# Patient Record
Sex: FEMALE | Race: WHITE | NOT HISPANIC OR LATINO | URBAN - METROPOLITAN AREA
[De-identification: names, ages, dates, MRNs, and addresses within clinical notes are randomized per-mention and may not be internally consistent; named-entity substitution may affect disease eponyms.]

---

## 2021-07-19 ENCOUNTER — IMPORTED ENCOUNTER (OUTPATIENT)
Dept: URBAN - METROPOLITAN AREA CLINIC 1 | Facility: CLINIC | Age: 71
End: 2021-07-19

## 2021-07-19 PROBLEM — H43.813: Noted: 2021-07-19

## 2021-07-19 PROBLEM — H40.1132: Noted: 2021-07-19

## 2021-07-19 PROBLEM — H04.123: Noted: 2021-07-19

## 2021-07-19 PROBLEM — H25.813: Noted: 2021-07-19

## 2021-07-19 PROCEDURE — 92015 DETERMINE REFRACTIVE STATE: CPT

## 2021-07-19 PROCEDURE — 92133 CPTRZD OPH DX IMG PST SGM ON: CPT

## 2021-07-19 PROCEDURE — 99204 OFFICE O/P NEW MOD 45 MIN: CPT

## 2021-07-19 NOTE — PATIENT DISCUSSION
1.  Severe OS/Moderate Open Angle Glaucoma OD -- (CD 0.50.8) OCT today showing Moderate thinning OS>OD. H/o SLT OU. Use Combigan BID OU and Lumigan QHS OU. Patient to continue with current gtt regimen. Patient advised to be compliant with gtts. Condition was discussed with patient and patient understands. Will continue to monitor patient for any progression in condition. Patient was advised to call us with any problems questions or concerns. 2.  Cataract OU -- Observe for now without intervention. The patient was advised to contact us if any change or worsening of vision3. PVD OU -- RD precautions. 4.  Dry Eyes OU -- The use/continuation of artificial tears were recommended. 5. [facility] CTL wearer Return for an appointment in 6 months for a 10/dfe/HVF/glare with Dr. Camille Jordan.

## 2022-02-04 ENCOUNTER — IMPORTED ENCOUNTER (OUTPATIENT)
Dept: URBAN - METROPOLITAN AREA CLINIC 1 | Facility: CLINIC | Age: 72
End: 2022-02-04

## 2022-02-04 PROBLEM — H40.1112: Noted: 2022-02-04

## 2022-02-04 PROBLEM — H40.1123: Noted: 2022-02-04

## 2022-02-04 PROBLEM — H25.813: Noted: 2022-02-04

## 2022-02-04 PROCEDURE — 99213 OFFICE O/P EST LOW 20 MIN: CPT

## 2022-02-04 PROCEDURE — 92083 EXTENDED VISUAL FIELD XM: CPT

## 2022-02-04 PROCEDURE — 92015 DETERMINE REFRACTIVE STATE: CPT

## 2022-02-04 NOTE — PATIENT DISCUSSION
1.  Severe Open Angle Glaucoma OS -Patient to continue with current gtt regimen. Patient advised to be compliant with gtts. Condition was discussed with patient and patient understands. Will continue to monitor patient for any progression in condition. Patient was advised to call us with any problems questions or concerns. 2.  Moderate Open Angle Glaucoma OD -Patient to continue with current gtt regimen. Patient advised to be compliant with gtts. Condition was discussed with patient and patient understands. Will continue to monitor patient for any progression in condition. Patient was advised to call us with any problems questions or concerns.

## 2022-02-18 ENCOUNTER — PRE-OP/H&P (OUTPATIENT)
Dept: URBAN - METROPOLITAN AREA CLINIC 1 | Facility: CLINIC | Age: 72
End: 2022-02-18

## 2022-02-18 DIAGNOSIS — H25.812: ICD-10-CM

## 2022-02-18 PROCEDURE — 92136 OPHTHALMIC BIOMETRY: CPT

## 2022-02-18 NOTE — PATIENT DISCUSSION
(Ascan/HP) Cataract (OS) -- Visually Significant (Secondary to glare), discussed the risks, benefits, alternatives, and limitations of cataract surgery. The patient stated a full understanding and a desire to proceed with the procedure. Discussed with patient if post-op drops are more than $120 for all three combined when filling at their Pharmacy, please call our office to request generic substitutions. Phaco PCL OS.

## 2022-03-02 ENCOUNTER — SURGERY/PROCEDURE (OUTPATIENT)
Dept: URBAN - METROPOLITAN AREA SURGERY 1 | Facility: SURGERY | Age: 72
End: 2022-03-02

## 2022-03-02 DIAGNOSIS — H25.813: ICD-10-CM

## 2022-03-02 PROCEDURE — 66984 XCAPSL CTRC RMVL W/O ECP: CPT

## 2022-03-02 NOTE — PATIENT DISCUSSION
Continue Combigan BID OU and Lumigan QHS OU. Patient advised to be compliant with gtts. Condition was discussed with patient and patient understands. Will continue to monitor patient for any progression in condition. Patient was advised to call us with any problems, questions, or concerns.

## 2022-03-03 ENCOUNTER — POST-OP (OUTPATIENT)
Dept: URBAN - METROPOLITAN AREA CLINIC 1 | Facility: CLINIC | Age: 72
End: 2022-03-03

## 2022-03-03 DIAGNOSIS — H25.811: ICD-10-CM

## 2022-03-03 PROCEDURE — 92136 OPHTHALMIC BIOMETRY: CPT

## 2022-03-03 RX ORDER — BRIMONIDINE TARTRATE, TIMOLOL MALEATE 2; 5 MG/ML; MG/ML: 1 SOLUTION/ DROPS OPHTHALMIC TWICE A DAY

## 2022-03-03 ASSESSMENT — TONOMETRY: OS_IOP_MMHG: 20

## 2022-03-03 ASSESSMENT — VISUAL ACUITY: OS_SC: 20/30

## 2022-03-03 NOTE — PATIENT DISCUSSION
Use Zylet BID OS and Prolensa Qdaily OS: Use gtts through completion of PO gtt chart regimen/ Per our instructions given to patient.

## 2022-03-03 NOTE — PATIENT DISCUSSION
(Ascan/HP) Cataract OD -- Visually Significant (Secondary to glare), discussed the risks, benefits, alternatives, and limitations of cataract surgery. The patient stated a full understanding and a desire to proceed with the procedure. Discussed with patient if post-op drops are more than $120 for all three combined when filling at their Pharmacy, please call our office to request generic substitutions.  Phaco PCL OD, planning myopic Goal.

## 2022-03-16 ENCOUNTER — SURGERY/PROCEDURE (OUTPATIENT)
Dept: URBAN - METROPOLITAN AREA SURGERY 1 | Facility: SURGERY | Age: 72
End: 2022-03-16

## 2022-03-16 DIAGNOSIS — H25.813: ICD-10-CM

## 2022-03-16 PROCEDURE — 66984 XCAPSL CTRC RMVL W/O ECP: CPT

## 2022-03-17 ENCOUNTER — POST-OP (OUTPATIENT)
Dept: URBAN - METROPOLITAN AREA CLINIC 1 | Facility: CLINIC | Age: 72
End: 2022-03-17

## 2022-03-17 DIAGNOSIS — Z96.1: ICD-10-CM

## 2022-03-17 PROCEDURE — 99024 POSTOP FOLLOW-UP VISIT: CPT

## 2022-03-17 ASSESSMENT — VISUAL ACUITY
OD_SC: 20/25
OS_SC: J2
OS_SC: 20/50

## 2022-03-17 ASSESSMENT — TONOMETRY
OS_IOP_MMHG: 25
OD_IOP_MMHG: 56

## 2022-03-17 NOTE — PATIENT DISCUSSION
IOP spike, 56 OD. 1 gtt Besivance instilled and aqueous release performed with 27 guage needle at slit lamp. Verbal consent given. IOP 4 post aqueous release.

## 2022-03-17 NOTE — PATIENT DISCUSSION
Use Zylet BID OD and Prolensa Qdaily OD: Use gtts through completion of PO gtt chart regimen/ Per our instructions given to patient.

## 2022-04-02 ASSESSMENT — VISUAL ACUITY
OD_GLARE: 20/40
OS_SC: 20/70
OD_SC: 20/30
OS_SC: 20/70-1
OD_SC: 20/40
OS_GLARE: 20/50

## 2022-04-02 ASSESSMENT — TONOMETRY
OS_IOP_MMHG: 14
OS_IOP_MMHG: 16
OD_IOP_MMHG: 16
OD_IOP_MMHG: 16

## 2022-04-29 ENCOUNTER — POST-OP (OUTPATIENT)
Dept: URBAN - METROPOLITAN AREA CLINIC 1 | Facility: CLINIC | Age: 72
End: 2022-04-29

## 2022-04-29 DIAGNOSIS — Z96.1: ICD-10-CM

## 2022-04-29 PROCEDURE — 99024 POSTOP FOLLOW-UP VISIT: CPT

## 2022-04-29 ASSESSMENT — TONOMETRY
OS_IOP_MMHG: 12
OD_IOP_MMHG: 14

## 2022-04-29 ASSESSMENT — VISUAL ACUITY
OS_SC: 20/60
OD_SC: 20/40

## 2022-08-29 ENCOUNTER — ESTABLISHED PATIENT (OUTPATIENT)
Dept: URBAN - METROPOLITAN AREA CLINIC 1 | Facility: CLINIC | Age: 72
End: 2022-08-29

## 2022-08-29 DIAGNOSIS — H40.1123: ICD-10-CM

## 2022-08-29 DIAGNOSIS — H40.1112: ICD-10-CM

## 2022-08-29 PROCEDURE — 92133 CPTRZD OPH DX IMG PST SGM ON: CPT

## 2022-08-29 PROCEDURE — 99213 OFFICE O/P EST LOW 20 MIN: CPT

## 2022-08-29 ASSESSMENT — VISUAL ACUITY
OD_SC: 20/40
OD_SC: J2
OS_SC: 20/50-2
OS_SC: J2

## 2022-08-29 ASSESSMENT — TONOMETRY
OD_IOP_MMHG: 15
OS_IOP_MMHG: 16

## 2022-08-29 NOTE — PATIENT DISCUSSION
Continue Combigan BID OU and Lumigan QHS OU. Patient advised to be compliant with gtts. OCT today shows no progression. Condition was discussed with patient and patient understands. Will continue to monitor patient for any progression in condition. Patient was advised to call us with any problems, questions, or concerns.

## 2023-04-28 ENCOUNTER — POST-OP (OUTPATIENT)
Dept: URBAN - METROPOLITAN AREA CLINIC 1 | Facility: CLINIC | Age: 73
End: 2023-04-28

## 2023-04-28 DIAGNOSIS — Z98.890: ICD-10-CM

## 2023-04-28 PROCEDURE — 99024 POSTOP FOLLOW-UP VISIT: CPT

## 2023-04-28 RX ORDER — DORZOLAMIDE HYDROCHLORIDE AND TIMOLOL MALEATE 20; 5 MG/ML; MG/ML: 1 SOLUTION/ DROPS OPHTHALMIC TWICE A DAY

## 2023-04-28 ASSESSMENT — VISUAL ACUITY
OD_CC: 20/60
OD_CC: J1
OS_CC: J1
OS_CC: 20/70

## 2023-09-07 ENCOUNTER — COMPREHENSIVE EXAM (OUTPATIENT)
Dept: URBAN - METROPOLITAN AREA CLINIC 1 | Facility: CLINIC | Age: 73
End: 2023-09-07

## 2023-09-07 DIAGNOSIS — H01.021: ICD-10-CM

## 2023-09-07 DIAGNOSIS — H40.1112: ICD-10-CM

## 2023-09-07 DIAGNOSIS — H43.813: ICD-10-CM

## 2023-09-07 DIAGNOSIS — H01.024: ICD-10-CM

## 2023-09-07 DIAGNOSIS — Z96.1: ICD-10-CM

## 2023-09-07 DIAGNOSIS — H35.372: ICD-10-CM

## 2023-09-07 DIAGNOSIS — H40.1123: ICD-10-CM

## 2023-09-07 PROCEDURE — 99214 OFFICE O/P EST MOD 30 MIN: CPT

## 2023-09-07 PROCEDURE — 92133 CPTRZD OPH DX IMG PST SGM ON: CPT

## 2023-09-07 ASSESSMENT — VISUAL ACUITY
OS_CC: 20/20-2
OS_SC: J3
OD_SC: J3
OD_CC: 20/20-1

## 2023-09-07 ASSESSMENT — TONOMETRY
OD_IOP_MMHG: 15
OS_IOP_MMHG: 15

## 2024-03-25 ENCOUNTER — FOLLOW UP (OUTPATIENT)
Dept: URBAN - METROPOLITAN AREA CLINIC 1 | Facility: CLINIC | Age: 74
End: 2024-03-25

## 2024-03-25 DIAGNOSIS — H04.123: ICD-10-CM

## 2024-03-25 DIAGNOSIS — H16.143: ICD-10-CM

## 2024-03-25 DIAGNOSIS — H40.1112: ICD-10-CM

## 2024-03-25 DIAGNOSIS — H40.1123: ICD-10-CM

## 2024-03-25 PROCEDURE — 99213 OFFICE O/P EST LOW 20 MIN: CPT

## 2024-03-25 PROCEDURE — 92083 EXTENDED VISUAL FIELD XM: CPT

## 2024-03-25 ASSESSMENT — TONOMETRY
OS_IOP_MMHG: 16
OD_IOP_MMHG: 16

## 2024-03-25 ASSESSMENT — VISUAL ACUITY
OS_CC: 20/25
OD_CC: 20/20

## 2024-10-07 ENCOUNTER — COMPREHENSIVE EXAM (OUTPATIENT)
Dept: URBAN - METROPOLITAN AREA CLINIC 1 | Facility: CLINIC | Age: 74
End: 2024-10-07

## 2024-10-07 DIAGNOSIS — H40.1112: ICD-10-CM

## 2024-10-07 DIAGNOSIS — H01.021: ICD-10-CM

## 2024-10-07 DIAGNOSIS — H04.123: ICD-10-CM

## 2024-10-07 DIAGNOSIS — H01.024: ICD-10-CM

## 2024-10-07 DIAGNOSIS — H16.143: ICD-10-CM

## 2024-10-07 DIAGNOSIS — H40.1123: ICD-10-CM

## 2024-10-07 DIAGNOSIS — Z96.1: ICD-10-CM

## 2024-10-07 DIAGNOSIS — H43.813: ICD-10-CM

## 2024-10-07 DIAGNOSIS — H35.373: ICD-10-CM

## 2024-10-07 PROCEDURE — 99214 OFFICE O/P EST MOD 30 MIN: CPT

## 2024-10-07 PROCEDURE — 92133 CPTRZD OPH DX IMG PST SGM ON: CPT

## 2024-11-14 ENCOUNTER — FOLLOW UP (OUTPATIENT)
Dept: URBAN - METROPOLITAN AREA CLINIC 1 | Facility: CLINIC | Age: 74
End: 2024-11-14

## 2024-11-14 DIAGNOSIS — H01.024: ICD-10-CM

## 2024-11-14 DIAGNOSIS — H35.373: ICD-10-CM

## 2024-11-14 DIAGNOSIS — H01.021: ICD-10-CM

## 2024-11-14 DIAGNOSIS — Z96.1: ICD-10-CM

## 2024-11-14 DIAGNOSIS — H40.1112: ICD-10-CM

## 2024-11-14 DIAGNOSIS — H16.143: ICD-10-CM

## 2024-11-14 DIAGNOSIS — H40.1123: ICD-10-CM

## 2024-11-14 DIAGNOSIS — H04.123: ICD-10-CM

## 2024-11-14 DIAGNOSIS — H43.813: ICD-10-CM

## 2024-11-14 PROCEDURE — 99213 OFFICE O/P EST LOW 20 MIN: CPT

## 2025-03-24 ENCOUNTER — FOLLOW UP (OUTPATIENT)
Age: 75
End: 2025-03-24

## 2025-03-24 DIAGNOSIS — H40.1112: ICD-10-CM

## 2025-03-24 DIAGNOSIS — H40.1123: ICD-10-CM

## 2025-03-24 PROCEDURE — 99213 OFFICE O/P EST LOW 20 MIN: CPT

## 2025-03-24 PROCEDURE — 92083 EXTENDED VISUAL FIELD XM: CPT
